# Patient Record
Sex: MALE | Race: WHITE | NOT HISPANIC OR LATINO | Employment: OTHER | URBAN - METROPOLITAN AREA
[De-identification: names, ages, dates, MRNs, and addresses within clinical notes are randomized per-mention and may not be internally consistent; named-entity substitution may affect disease eponyms.]

---

## 2024-03-21 ENCOUNTER — OFFICE VISIT (OUTPATIENT)
Dept: SLEEP MEDICINE | Facility: HOSPITAL | Age: 74
End: 2024-03-21
Payer: MEDICARE

## 2024-03-21 VITALS
BODY MASS INDEX: 27.28 KG/M2 | DIASTOLIC BLOOD PRESSURE: 76 MMHG | WEIGHT: 180 LBS | OXYGEN SATURATION: 93 % | HEART RATE: 69 BPM | TEMPERATURE: 97.2 F | SYSTOLIC BLOOD PRESSURE: 112 MMHG | HEIGHT: 68 IN

## 2024-03-21 DIAGNOSIS — G47.33 OSA ON CPAP: Primary | ICD-10-CM

## 2024-03-21 DIAGNOSIS — I48.3 TYPICAL ATRIAL FLUTTER (MULTI): ICD-10-CM

## 2024-03-21 PROCEDURE — 99214 OFFICE O/P EST MOD 30 MIN: CPT | Mod: GC | Performed by: STUDENT IN AN ORGANIZED HEALTH CARE EDUCATION/TRAINING PROGRAM

## 2024-03-21 PROCEDURE — 1126F AMNT PAIN NOTED NONE PRSNT: CPT | Performed by: STUDENT IN AN ORGANIZED HEALTH CARE EDUCATION/TRAINING PROGRAM

## 2024-03-21 PROCEDURE — 99204 OFFICE O/P NEW MOD 45 MIN: CPT | Performed by: STUDENT IN AN ORGANIZED HEALTH CARE EDUCATION/TRAINING PROGRAM

## 2024-03-21 PROCEDURE — 1036F TOBACCO NON-USER: CPT | Performed by: STUDENT IN AN ORGANIZED HEALTH CARE EDUCATION/TRAINING PROGRAM

## 2024-03-21 PROCEDURE — 1159F MED LIST DOCD IN RCRD: CPT | Performed by: STUDENT IN AN ORGANIZED HEALTH CARE EDUCATION/TRAINING PROGRAM

## 2024-03-21 RX ORDER — ROSUVASTATIN CALCIUM 5 MG/1
5 TABLET, COATED ORAL NIGHTLY
COMMUNITY
Start: 2024-02-20

## 2024-03-21 SDOH — ECONOMIC STABILITY: FOOD INSECURITY: WITHIN THE PAST 12 MONTHS, THE FOOD YOU BOUGHT JUST DIDN'T LAST AND YOU DIDN'T HAVE MONEY TO GET MORE.: NEVER TRUE

## 2024-03-21 SDOH — ECONOMIC STABILITY: FOOD INSECURITY: WITHIN THE PAST 12 MONTHS, YOU WORRIED THAT YOUR FOOD WOULD RUN OUT BEFORE YOU GOT MONEY TO BUY MORE.: NEVER TRUE

## 2024-03-21 ASSESSMENT — LIFESTYLE VARIABLES: HOW OFTEN DO YOU HAVE A DRINK CONTAINING ALCOHOL: NEVER

## 2024-03-21 ASSESSMENT — PATIENT HEALTH QUESTIONNAIRE - PHQ9
SUM OF ALL RESPONSES TO PHQ9 QUESTIONS 1 & 2: 0
1. LITTLE INTEREST OR PLEASURE IN DOING THINGS: NOT AT ALL
2. FEELING DOWN, DEPRESSED OR HOPELESS: NOT AT ALL

## 2024-03-21 ASSESSMENT — PAIN SCALES - GENERAL: PAINLEVEL: 0-NO PAIN

## 2024-03-21 NOTE — PATIENT INSTRUCTIONS
[facility] Sleep Medicine  [facility]  [address]  [facility] [address]  [phone]       NAME: Justo Womack   DATE: 03/21/24    Your Sleep Provider Today: Gabriel Fitch MD  Your Primary Care Physician: No primary care provider on file.   Your Referring Provider: No ref. provider found    DIAGNOSIS:   No diagnosis found.    Thank you for coming to the Sleep Medicine Clinic today! Your sleep medicine provider today was: Gabriel Fitch MD Below is a summary of your treatment plan, other important information, and our contact numbers:      TREATMENT PLAN     - let us know about pursuing inspire  - if so, we will refer you to ENT surgeon for consultation including DISE (drug induced sleep endoscopy)   - additionally we may do a home sleep test that we can mail out to you    Please call us for any questions!    Follow-up Appointment:   As needed pending above       IMPORTANT INFORMATION     Call 911 for medical emergencies.  Our offices are generally open from Monday-Friday, 9 am - 5 pm.  If you need to get in touch with me, you may either call me and my team(number is below) or you can use [employer].  If a referral for a test, for CPAP, or for another specialist was made, and you have not heard about scheduling this within a week, please call scheduling at [phone] ([phone]).  If you are unable to make your appointment for clinic or an overnight study, kindly call the office at least 48 hours in advance to cancel and reschedule.  If you are on CPAP, please bring your device's card or the device to each clinic appointment.   There are no supporting services by either the sleep doctors or their staff on weekends and Holidays, or after 5 PM on weekdays.   If you have been asked to come to a sleep study, make sure you bring toiletries, a comfy pillow, and any nighttime medications that you may regularly take. Also be sure to eat dinner before you  arrive. We generally do not provide meals.      PRESCRIPTIONS     We require 7 days advanced notice for prescription refills. If we do not receive the request in this time, we cannot guarantee that your medication will be refilled in time.      IMPORTANT PHONE NUMBERS     Sleep Medicine Clinic Fax: [phone]  Appointments (for Adult Sleep Clinic): [phone] - option 2  Appointments (For Sleep Studies): [phone] - option 3  Behavioral Sleep Medicine: [phone]  Sleep Surgery: [phone]  ENT (Otolaryngology): [phone]  Headache Clinic (Neurology): [phone]  Neurology: [phone]  Psychiatry: [phone]  Pulmonary Function Testing (PFT) Center: [phone]  Pulmonary Medicine: [phone]  [facility] (DME): [phone]  [facility] (DME): [phone]  [facility] (DME): [phone]      OUR ADULT SLEEP MEDICINE TEAM   Please do not hesitate to call the office or sleep nurse with any questions between appointments:    Adult Sleep Nurses (Kaley Munoz, RN and Judy Coker RN):  For clinical questions and refilling prescriptions: [phone]  Email sleep diaries and other documents at: [email or web address]    Adult Sleep Medicine Secretaries:  Qiana Feliciano (For Adina/Ulrich/Krise/Strohl/Yeh/[place]):   P: [phone]  F: [phone]  Jen Barnes (For Carter/Guggenbiller): P: [phone]  Fax: [phone]  Karissa No (For Jurcevic/Blank): P: [phone]  F: [phone]  Grace Fitzgerald (For [place]): P: [phone]  F: [phone]  Eli Mckoy (For Diann/Leonora/Zakhary): P: [phone]  F: [phone]  Pamela Whitehead (For Addison/Felipe): P: [phone]  F: [phone]     Adult Sleep Medicine Advanced Practice Providers:  Albino Morton ([place], [place])  Sandie Lea ([facility])  Tasha Ruiz CNP ([facility])  Cynthia Manuel, GLORIA ([place], [place], [place])  Vinita,  "Miley ([place], [place], [place])  Mariza Felipe CNP ([place], [place])        OUR SLEEP TESTING LOCATIONS     Our team will contact you to schedule your sleep study, however, you can contact us as follow:  Main Phone Line (scheduling only): [phone]), option 3  Adult and Pediatric Locations  [facility] (6 years and older): [facility] - 4th floor ([address] [facility]) After hours line: [phone]  [facility] (Main campus: All ages): [facility], 6th floor. After hours line: [phone]   [place] (5 years and older; younger considered on case-by-case basis): [address]; [address], [address]. Scheduling  After hours line: [phone]   [place] (6 years and older): [address]; [address]; [address]   [place] (6 years and older): [address]  After hours line: [phone]   [place] (13 years and older) in [place]: [address], 2nd floor  After hours line: [phone]  [facility] (13 year and older): [address]  After hours line: [phone]     Adult Only Locations:   [place] (18 years and older): [address], 2nd floor   [place] (18 years and older): [address]; 4th floor  After hours line: [phone]  [facility] (18 years and older) at [place]: [address]  After hours line: [phone]          CONTACTING YOUR SLEEP MEDICINE PROVIDER     Send a message directly to your provider through \"My Chart\", which is the email service through your  Records Account: [email or web address]   Call [phone] and leave a message. One of the administrative assistants will forward the message to your sleep medicine provider through \"My Chart\" and/or email.     Your sleep medicine provider for this visit was: Gabriel Fitch MD   "

## 2024-03-21 NOTE — PROGRESS NOTES
" Patient: Justo Womack    [id number]  : 1950 -- AGE 74 y.o.    Provider: Laurent Snow DO     Location [facility]   Service Date: 3/21/2024            [facility] Sleep Medicine Clinic  New Visit Note      HISTORY OF PRESENT ILLNESS     The patient's referring provider is: No ref. provider found; No primary care provider on file.     Chief Complaint:  CHANCE - discuss inspire     HISTORY OF PRESENT ILLNESS   Justo Womack is a 74 y.o. male with a past medical history significant for aflutter s/p cardioversion  who presents to a [facility] Sleep Medicine Clinic for a comprehensive sleep medicine evaluation.     PAST SLEEP HISTORY  PSG unable to view sleep testing data. He reports recent testing about 1 year ago at [facility] in [place]. Reports a split night where AHI was around \"70\".  Patient is prescribed CPAP.     CURRENT HISTORY:  Today patient reports being on CPAP consistently since last , had a period of time off of CPAP for about 6 months as thought he no longer needed. He is toelrating PAP and admits to weraning nightly. He wears PAP mostly for cardiac (aflutter) reasons. He doesn't admit to changes in sleepiness when he wears PAP. Some nights his mouth opens- used to use nasal strap/mouth tape. Using nasal pillows (p30i), but prefers nasal mask.     Comprehensive sleep history includes bedtime at 1130p and it typically takes him 15 minutes to fall asleep. he wakes 1 times per night. Awakenings are usually prolonged- occurring 330-5a he wakes at 0500 or earlier feeling refreshed. he is taking naps once weekly.     Total sleep times estimated to be 5 hours.     Review of sleep-specific symptoms reveals the following:  he denies RLS, no cataplexy, hypnagogic or hypnopompic hallucinations, sleep paralysis, nightmares or parasomnias. They do not act out their dreams.    Preferred sleeping position: SLEEP POSITION: supine and sidelying.   ESS: 4  TETE: " "9  FOSQ: 38      REVIEW OF SYSTEMS     REVIEW OF SYSTEMS  Review of Systems  Denies daytime sleepiness, snoring, witnessed apneas, morning headaches- with CPAP      ALLERGIES AND MEDICATIONS     ALLERGIES  No Known Allergies    MEDICATIONS  Current Outpatient Medications   Medication Sig Dispense Refill    apixaban (Eliquis) 5 mg tablet Take 1 tablet (5 mg) by mouth twice a day.      rosuvastatin (Crestor) 5 mg tablet Take 1 tablet (5 mg) by mouth once daily at bedtime.       No current facility-administered medications for this visit.         PAST HISTORY     PAST MEDICAL HISTORY  He  has no past medical history on file.    PAST SURGICAL HISTORY:  No past surgical history on file.    FAMILY HISTORY  No family history on file.    He does not have a family history of sleep disorder.    SOCIAL HISTORY  He  reports that he has never smoked. He has never used smokeless tobacco. He reports that he does not drink alcohol and does not use drugs. He currently lives with a partner.     Occupation: retired    Caffeine consumption: Yes, 1 cups/day  Alcohol consumption: No  Smoking: No  Marijuana: No      PHYSICAL EXAM     VITAL SIGNS: /76 (BP Location: Left arm, Patient Position: Sitting)   Pulse 69   Temp 36.2 °C (97.2 °F) (Temporal)   Ht 1.715 m (5' 7.5\")   Wt 81.6 kg (180 lb)   SpO2 93% Comment: RA  BMI 27.78 kg/m²      CURRENT WEIGHT:   Vitals:    03/21/24 1440   Weight: 81.6 kg (180 lb)     Body mass index is 27.78 kg/m².  PREVIOUS WEIGHTS:  Wt Readings from Last 3 Encounters:   03/21/24 81.6 kg (180 lb)       Physical Exam:  Neck circumference: 17.5 inches.  General: Alert, oriented, conversant.  HEENT: Lateral facial profile normal, nasal septum midline, turbinates mild, oropharynx is Mallampati class III, tongue moderate, jaw occlusion clas I, dentition okay.  Heart: RRR, no murmur  Lungs: CTAB, no wheezing  Extremities: no peripheral edema  Neurologic: Language is normal and fluent, face symmetric, tongue " "protrusion midline, stance and gait normal.   Psychiatric: Affect appropriate, mood normal.    RESULTS/DATA     No results found for: \"CO2\", \"IRON\", \"TRANSFERRIN\", \"IRONSAT\", \"TIBC\", \"FERRITIN\"          ASSESSMENT/PLAN     Mr. Womack is a 74 y.o. male and He was referred to the [facility] Sleep Medicine Clinic for evaluation of CHANCE.     CHANCE on CPAP  - no sleep testing data available in clinic today ; pt reported as severe  - here to discuss Inspire option for treatment   - discussed risks, benefits, and criteria including need of DISE   - would need sleep study data to confirm no CSA and severity consider HSAT  Shared decision making: patient to think about pursuing inspire and call us. At that point we will refere to ENT and obtain HSAT    Aflutter  - s/p cardioversion x2  - on eliquis  - following cardiology       We discussed sleep hygiene including regular sleep/wake times, avoidance of technology in bed, a regular bedtime routine, and avoiding daytime naps.    Patient was advised not to drive or operate heavy machinery when sleepy.  Importance of adhering to CPAP was emphasized and the ramifications of untreated sleep apnea was discussed today.    Thank you for involving Sleep Medicine in this patient's care. It was a pleasure to see Justo Womack today. We will plan to follow up as needed post ENT evaluation.    The patient was seen and discussed with attending Dr Fitch at the time of the encounter.  Laurent Snow, DO  Sleep Medicine Fellow   "